# Patient Record
(demographics unavailable — no encounter records)

---

## 2025-01-22 NOTE — ASSESSMENT
[FreeTextEntry1] : 50yo M with urinary hesitancy likely 2/2 BPH  BPH - The anatomy of the lower genitourinary tract was explained to the patient, with the urine passing through the bladder neck and prostatic urethra as it exits the body. Prostatic enlargement can constrict the lumen and the bladder outlet, causing incomplete bladder emptying and irritative symptoms such and frequency and urgency. Alpha blockers can be used to relax the bladder neck and facilitate passage of urine from the bladder. 5-alpha reductase inhibitors can be employed to shrink the prostate and thereby allow for passage of urine more easily. Anticholinergics to decrease bladder irritative symptoms were also discussed, but it was stressed that they can increase post void residual and risk urinary retention. - start silodosin 8mg daily - obtain renal bladder US - obtain UA and UCx  PSA screening - positive family history - PSA level  Possible microscopic hematuria? - repeat urine studies, bring prior records - if positive will need cystoscopy  Follow-up appointment in 2 weeks for continued management of urinary hesitancy

## 2025-01-22 NOTE — HISTORY OF PRESENT ILLNESS
[FreeTextEntry1] : Patient is a 52yo M with history of anxiety, bipolar disorder, smoking presenting with difficulty urinating. Reports he has had issues emptying his bladder for several years now. He complains of weak stream, difficulty urinating and straining. These symptoms have worsened over the last 6 months. Had a catheter placed about 1 year ago while in the hospital but was removed prior to discharge.  His PCP started him on flomax 2 months ago but he did not notice much improvement. Also reports his PCP told him he has blood in his urine on two separate urine tests.  Often sits down to urinate and strains to empty. Often has sensation to urinate but cannot always get it out. Denies UTIs, stones, gross hematuria.   Has not had a PSA test before.   Current smoker Denies family history of  cancers.   He is unable to urinate today in the office but random bladder scan showed volume of 179cc.